# Patient Record
Sex: FEMALE | Race: WHITE | NOT HISPANIC OR LATINO | Employment: OTHER | URBAN - METROPOLITAN AREA
[De-identification: names, ages, dates, MRNs, and addresses within clinical notes are randomized per-mention and may not be internally consistent; named-entity substitution may affect disease eponyms.]

---

## 2023-11-10 PROBLEM — M54.10 RADICULOPATHY OF LEG: Status: ACTIVE | Noted: 2023-11-10

## 2023-11-13 ENCOUNTER — LAB (OUTPATIENT)
Dept: LAB | Facility: LAB | Age: 68
End: 2023-11-13
Payer: MEDICARE

## 2023-11-13 ENCOUNTER — PRE-ADMISSION TESTING (OUTPATIENT)
Dept: PREADMISSION TESTING | Facility: HOSPITAL | Age: 68
End: 2023-11-13
Payer: MEDICARE

## 2023-11-13 VITALS
TEMPERATURE: 98.4 F | SYSTOLIC BLOOD PRESSURE: 140 MMHG | BODY MASS INDEX: 25.59 KG/M2 | HEIGHT: 63 IN | WEIGHT: 144.4 LBS | DIASTOLIC BLOOD PRESSURE: 78 MMHG | RESPIRATION RATE: 12 BRPM | HEART RATE: 85 BPM | OXYGEN SATURATION: 96 %

## 2023-11-13 DIAGNOSIS — M16.12 OSTEOARTHRITIS OF LEFT HIP, UNSPECIFIED OSTEOARTHRITIS TYPE: ICD-10-CM

## 2023-11-13 DIAGNOSIS — Z01.818 PREOP EXAMINATION: ICD-10-CM

## 2023-11-13 DIAGNOSIS — M16.12 OSTEOARTHRITIS OF LEFT HIP, UNSPECIFIED OSTEOARTHRITIS TYPE: Primary | ICD-10-CM

## 2023-11-13 LAB
ANION GAP SERPL CALC-SCNC: 12 MMOL/L (ref 10–20)
BUN SERPL-MCNC: 15 MG/DL (ref 6–23)
CALCIUM SERPL-MCNC: 10.1 MG/DL (ref 8.6–10.3)
CHLORIDE SERPL-SCNC: 101 MMOL/L (ref 98–107)
CO2 SERPL-SCNC: 29 MMOL/L (ref 21–32)
CREAT SERPL-MCNC: 0.69 MG/DL (ref 0.5–1.05)
ERYTHROCYTE [DISTWIDTH] IN BLOOD BY AUTOMATED COUNT: 13.2 % (ref 11.5–14.5)
GFR SERPL CREATININE-BSD FRML MDRD: >90 ML/MIN/1.73M*2
GLUCOSE SERPL-MCNC: 83 MG/DL (ref 74–99)
HCT VFR BLD AUTO: 42 % (ref 36–46)
HGB BLD-MCNC: 13.7 G/DL (ref 12–16)
MCH RBC QN AUTO: 31.1 PG (ref 26–34)
MCHC RBC AUTO-ENTMCNC: 32.6 G/DL (ref 32–36)
MCV RBC AUTO: 95 FL (ref 80–100)
NRBC BLD-RTO: 0 /100 WBCS (ref 0–0)
PLATELET # BLD AUTO: 275 X10*3/UL (ref 150–450)
POTASSIUM SERPL-SCNC: 4.4 MMOL/L (ref 3.5–5.3)
RBC # BLD AUTO: 4.41 X10*6/UL (ref 4–5.2)
SODIUM SERPL-SCNC: 138 MMOL/L (ref 136–145)
WBC # BLD AUTO: 10.9 X10*3/UL (ref 4.4–11.3)

## 2023-11-13 PROCEDURE — 86901 BLOOD TYPING SEROLOGIC RH(D): CPT

## 2023-11-13 PROCEDURE — 85027 COMPLETE CBC AUTOMATED: CPT

## 2023-11-13 PROCEDURE — 99202 OFFICE O/P NEW SF 15 MIN: CPT

## 2023-11-13 PROCEDURE — 93010 ELECTROCARDIOGRAM REPORT: CPT | Performed by: INTERNAL MEDICINE

## 2023-11-13 PROCEDURE — 93005 ELECTROCARDIOGRAM TRACING: CPT

## 2023-11-13 PROCEDURE — 87081 CULTURE SCREEN ONLY: CPT | Mod: STJLAB | Performed by: ORTHOPAEDIC SURGERY

## 2023-11-13 PROCEDURE — 86850 RBC ANTIBODY SCREEN: CPT

## 2023-11-13 PROCEDURE — 86900 BLOOD TYPING SEROLOGIC ABO: CPT

## 2023-11-13 PROCEDURE — 80048 BASIC METABOLIC PNL TOTAL CA: CPT

## 2023-11-13 PROCEDURE — 36415 COLL VENOUS BLD VENIPUNCTURE: CPT

## 2023-11-13 RX ORDER — CHLORHEXIDINE GLUCONATE ORAL RINSE 1.2 MG/ML
SOLUTION DENTAL
Qty: 473 ML | Refills: 0 | Status: SHIPPED | OUTPATIENT
Start: 2023-11-27 | End: 2023-11-28 | Stop reason: HOSPADM

## 2023-11-13 RX ORDER — NAPROXEN 250 MG/1
500 TABLET ORAL DAILY PRN
COMMUNITY
End: 2023-11-28 | Stop reason: HOSPADM

## 2023-11-13 ASSESSMENT — DUKE ACTIVITY SCORE INDEX (DASI)
CAN YOU DO YARD WORK LIKE RAKING LEAVES, WEEDING OR PUSHING A MOWER: NO
CAN YOU RUN A SHORT DISTANCE: NO
CAN YOU DO LIGHT WORK AROUND THE HOUSE LIKE DUSTING OR WASHING DISHES: YES
CAN YOU PARTICIPATE IN STRENOUS SPORTS LIKE SWIMMING, SINGLES TENNIS, FOOTBALL, BASKETBALL, OR SKIING: NO
CAN YOU PARTICIPATE IN MODERATE RECREATIONAL ACTIVITIES LIKE GOLF, BOWLING, DANCING, DOUBLES TENNIS OR THROWING A BASEBALL OR FOOTBALL: NO
TOTAL_SCORE: 21.45
CAN YOU CLIMB A FLIGHT OF STAIRS OR WALK UP A HILL: YES
CAN YOU WALK INDOORS, SUCH AS AROUND YOUR HOUSE: YES
DASI METS SCORE: 5.4
CAN YOU HAVE SEXUAL RELATIONS: YES
CAN YOU TAKE CARE OF YOURSELF (EAT, DRESS, BATHE, OR USE TOILET): YES
CAN YOU WALK A BLOCK OR TWO ON LEVEL GROUND: NO
CAN YOU DO MODERATE WORK AROUND THE HOUSE LIKE VACUUMING, SWEEPING FLOORS OR CARRYING GROCERIES: YES
CAN YOU DO HEAVY WORK AROUND THE HOUSE LIKE SCRUBBING FLOORS OR LIFTING AND MOVING HEAVY FURNITURE: NO

## 2023-11-13 ASSESSMENT — ACTIVITIES OF DAILY LIVING (ADL): ADL_SCORE: 0

## 2023-11-13 ASSESSMENT — CHADS2 SCORE
HYPERTENSION: NO
PRIOR STROKE OR TIA OR THROMBOEMBOLISM: NO
CHADS2 SCORE: 0
AGE GREATER THAN OR EQUAL TO 75: NO
DIABETES: NO
CHF: NO

## 2023-11-13 ASSESSMENT — LIFESTYLE VARIABLES: SMOKING_STATUS: SMOKER

## 2023-11-13 ASSESSMENT — PAIN - FUNCTIONAL ASSESSMENT: PAIN_FUNCTIONAL_ASSESSMENT: 0-10

## 2023-11-13 NOTE — H&P (VIEW-ONLY)
"CPM/PAT Evaluation       Name: Rosaline Sanches (Rosaline Sanches)  /Age: 1955/68 y.o.     In-Person       Chief Complaint: Left hip pain    HPI  Pleasant 69 y/o female presents with left hip replacement. She has been having pain for over 4 years now. She had her right hip replaced roughly 4 years ago. Pain is \"unbearable\" and had significantly worsened over time. Pain occasionally radiates down her leg to her ankle. Endorses occasional numbness/tingling in the left foot.     Past Medical History:   Diagnosis Date    Arthritis        Past Surgical History:   Procedure Laterality Date    HIP ARTHROPLASTY      right       Patient  has no history on file for sexual activity.    Family History   Problem Relation Name Age of Onset    Heart disease Mother         No Known Allergies    Prior to Admission medications    Not on File        Constitutional: Negative for fever, chills, or sweats   ENMT: Negative for nasal discharge, congestion, ear pain, mouth pain, throat pain. Positive for reading glasses.    Respiratory: Negative for cough, wheezing, shortness of breath   Cardiac: Negative for chest pain, dyspnea on exertion, palpitations   Gastrointestinal: Negative for nausea, vomiting, diarrhea, constipation, abdominal pain  Genitourinary: Negative for dysuria, flank pain, frequency, hematuria   Musculoskeletal: Negative for decreased ROM, pain, swelling, weakness. Positive for left hip pain.     Neurological: Negative for dizziness, confusion, headache  Psychiatric: Negative for mood changes   Skin: Negative for itching, rash, ulcer    Hematologic/Lymph: Negative for bruising, easy bleeding  Allergic/Immunologic: Negative itching, sneezing, swelling    Physical Exam  Vitals reviewed.   Constitutional:       Appearance: Normal appearance.   HENT:      Head: Normocephalic.      Mouth/Throat:      Mouth: Mucous membranes are moist.      Pharynx: Oropharynx is clear.   Eyes:      Pupils: Pupils are equal, round, and " reactive to light.   Cardiovascular:      Rate and Rhythm: Normal rate and regular rhythm.      Heart sounds: Normal heart sounds.   Pulmonary:      Effort: Pulmonary effort is normal.      Breath sounds: Normal breath sounds.   Abdominal:      General: Bowel sounds are normal.      Palpations: Abdomen is soft.   Musculoskeletal:         General: Normal range of motion.      Cervical back: Normal range of motion.   Skin:     General: Skin is warm and dry.   Neurological:      General: No focal deficit present.      Mental Status: She is alert and oriented to person, place, and time.   Psychiatric:         Mood and Affect: Mood normal.         Behavior: Behavior normal.        PAT AIRWAY:   Airway:     Mallampati::  II    Neck ROM::  Full  normal        Visit Vitals  /78   Pulse 85   Temp 36.9 °C (98.4 °F) (Temporal)   Resp 12       DASI Risk Score      Flowsheet Row Most Recent Value   DASI SCORE 21.45   METS Score (Will be calculated only when all the questions are answered) 5.4          Caprini DVT Assessment      Flowsheet Row Most Recent Value   DVT Score 12   Current Status Major surgery planned, lasting 2-3 hours, Elective major lower extremity arthroplasty   History Prior major surgery   Age 60-75 years   BMI 30 or less          Modified Frailty Index      Flowsheet Row Most Recent Value   Modified Frailty Index Calculator 0          CHADS2 Stroke Risk  Current as of 2 minutes ago        N/A 3 - 100%: High Risk   2 - 3%: Medium Risk   0 - 2%: Low Risk     Last Change: N/A          This score determines the patient's risk of having a stroke if the patient has atrial fibrillation.        This score is not applicable to this patient. Components are not calculated.          Revised Cardiac Risk Index    No data to display       Apfel Simplified Score    No data to display       Risk Analysis Index Results This Encounter         11/13/2023  1307             MNEDOZA Cancer History: Patient does not indicate  history of cancer    Total Risk Analysis Index Score Without Cancer: 20    Total Risk Analysis Index Score: 20          Stop Bang Score      Flowsheet Row Most Recent Value   Do you snore loudly? 0   Do you often feel tired or fatigued after your sleep? 1   Has anyone ever observed you stop breathing in your sleep? 0   Do you have or are you being treated for high blood pressure? 0   Recent BMI (Calculated) 25.6   Is BMI greater than 35 kg/m2? 0=No   Age older than 50 years old? 1=Yes   Is your neck circumference greater than 17 inches (Male) or 16 inches (Female)? 0   Gender - Male 0=No   STOP-BANG Total Score 2            Assessment and Plan:     OR with Dr. Tatum on 11/27  Labs ordered per anesthesia guidelines.   EKG obtained and enclosed. NSR.     Patient has no prior anesthesia complications. Has tolerated anesthesia well in the past.   See risk scores as previously documented.

## 2023-11-13 NOTE — PREPROCEDURE INSTRUCTIONS
Medication List            Accurate as of November 13, 2023  1:26 PM. Always use your most recent med list.                MAGNESIUM GLYCINATE ORAL  Medication Adjustments for Surgery: Stop 7 days before surgery     multivitamin-Ca-iron-minerals 27-0.4 mg tablet  Commonly known as: Tab-A-Louie Womens  Medication Adjustments for Surgery: Stop 7 days before surgery     naproxen 250 mg tablet  Commonly known as: Naprosyn  Medication Adjustments for Surgery: Stop 7 days before surgery                          PRE-OPERATIVE INSTRUCTIONS    You will receive notification one business day prior to your surgery to confirm your arrival time and additional information. It is important that you answer your phone and/or check your messages during this time.    Please enter the building through the Outpatient entrance. Take the elevator off the lobby to the 2nd floor and check in at the Outpatient Surgery desk    INSTRUCTIONS:  Talk to your surgeon for instructions if you should stop your aspirin, blood thinner, or diabetes medicines.  DO NOT take any multivitamins or over the counter supplements for 7-10 days before surgery.  If not being admitted, you must have an adult immediately available to drive you home after surgery. We also highly recommend you have someone stay with you for the entire day and night of your surgery.  For children having surgery, a parent or legal guardian must accompany t hem to the surgery center. If this is not possible, please call [phone] to make additional arrangements.  For adults who are unable to consent or make medical decisions for themselves, a legal guardian or Power of  must accompany them to the surgery center. If this is not possible, please call [phone] to make additional arrangements.  Wear comfortable, loose fitting clothing.  All jewelry and piercings must be removed. If you are unable to remove an item or have a dermal piercing, please be sure to tell the nurse  when you arrive for surgery.  Nail polish and make-up must be removed.  Avoid smoking or consuming alcohol for 24 hours before surgery.  To help prevent infection, please take a shower/bath and wash your hair the night before and/or morning of surgery.    Additional instructions about eating and drinking before surgery:  Do not eat any solid foods or drink anything but clear liquids within 6 hours of your arrival time for surgery. Milk, nutritional drinks/supplements, and infant formula are considered solid foods.  You may drink up to 12 oz. of clear liquids up to 2 hours before your arrival time for surgery, unless directed otherwise by your surgeon. Clear liquids include water, non-carbonated sports drinks (Gatorade), black tea or coffee (no creamers) and breast milk.    If you received a blue folder, please review additional information provided inside the folder regarding additional preparation.     If you have any questions or concerns, please call Pre-Admission Testing at [phone].

## 2023-11-13 NOTE — CPM/PAT H&P
"CPM/PAT Evaluation       Name: Rosaline Sanches (Rosaline Sanches)  /Age: 1955/68 y.o.     In-Person       Chief Complaint: Left hip pain    HPI  Pleasant 69 y/o female presents with left hip replacement. She has been having pain for over 4 years now. She had her right hip replaced roughly 4 years ago. Pain is \"unbearable\" and had significantly worsened over time. Pain occasionally radiates down her leg to her ankle. Endorses occasional numbness/tingling in the left foot.     Past Medical History:   Diagnosis Date    Arthritis        Past Surgical History:   Procedure Laterality Date    HIP ARTHROPLASTY      right       Patient  has no history on file for sexual activity.    Family History   Problem Relation Name Age of Onset    Heart disease Mother         No Known Allergies    Prior to Admission medications    Not on File        Constitutional: Negative for fever, chills, or sweats   ENMT: Negative for nasal discharge, congestion, ear pain, mouth pain, throat pain. Positive for reading glasses.    Respiratory: Negative for cough, wheezing, shortness of breath   Cardiac: Negative for chest pain, dyspnea on exertion, palpitations   Gastrointestinal: Negative for nausea, vomiting, diarrhea, constipation, abdominal pain  Genitourinary: Negative for dysuria, flank pain, frequency, hematuria   Musculoskeletal: Negative for decreased ROM, pain, swelling, weakness. Positive for left hip pain.     Neurological: Negative for dizziness, confusion, headache  Psychiatric: Negative for mood changes   Skin: Negative for itching, rash, ulcer    Hematologic/Lymph: Negative for bruising, easy bleeding  Allergic/Immunologic: Negative itching, sneezing, swelling    Physical Exam  Vitals reviewed.   Constitutional:       Appearance: Normal appearance.   HENT:      Head: Normocephalic.      Mouth/Throat:      Mouth: Mucous membranes are moist.      Pharynx: Oropharynx is clear.   Eyes:      Pupils: Pupils are equal, round, and " reactive to light.   Cardiovascular:      Rate and Rhythm: Normal rate and regular rhythm.      Heart sounds: Normal heart sounds.   Pulmonary:      Effort: Pulmonary effort is normal.      Breath sounds: Normal breath sounds.   Abdominal:      General: Bowel sounds are normal.      Palpations: Abdomen is soft.   Musculoskeletal:         General: Normal range of motion.      Cervical back: Normal range of motion.   Skin:     General: Skin is warm and dry.   Neurological:      General: No focal deficit present.      Mental Status: She is alert and oriented to person, place, and time.   Psychiatric:         Mood and Affect: Mood normal.         Behavior: Behavior normal.        PAT AIRWAY:   Airway:     Mallampati::  II    Neck ROM::  Full  normal        Visit Vitals  /78   Pulse 85   Temp 36.9 °C (98.4 °F) (Temporal)   Resp 12       DASI Risk Score      Flowsheet Row Most Recent Value   DASI SCORE 21.45   METS Score (Will be calculated only when all the questions are answered) 5.4          Caprini DVT Assessment      Flowsheet Row Most Recent Value   DVT Score 12   Current Status Major surgery planned, lasting 2-3 hours, Elective major lower extremity arthroplasty   History Prior major surgery   Age 60-75 years   BMI 30 or less          Modified Frailty Index      Flowsheet Row Most Recent Value   Modified Frailty Index Calculator 0          CHADS2 Stroke Risk  Current as of 2 minutes ago        N/A 3 - 100%: High Risk   2 - 3%: Medium Risk   0 - 2%: Low Risk     Last Change: N/A          This score determines the patient's risk of having a stroke if the patient has atrial fibrillation.        This score is not applicable to this patient. Components are not calculated.          Revised Cardiac Risk Index    No data to display       Apfel Simplified Score    No data to display       Risk Analysis Index Results This Encounter         11/13/2023  1307             MENDOZA Cancer History: Patient does not indicate  history of cancer    Total Risk Analysis Index Score Without Cancer: 20    Total Risk Analysis Index Score: 20          Stop Bang Score      Flowsheet Row Most Recent Value   Do you snore loudly? 0   Do you often feel tired or fatigued after your sleep? 1   Has anyone ever observed you stop breathing in your sleep? 0   Do you have or are you being treated for high blood pressure? 0   Recent BMI (Calculated) 25.6   Is BMI greater than 35 kg/m2? 0=No   Age older than 50 years old? 1=Yes   Is your neck circumference greater than 17 inches (Male) or 16 inches (Female)? 0   Gender - Male 0=No   STOP-BANG Total Score 2            Assessment and Plan:     OR with Dr. Tatum on 11/27  Labs ordered per anesthesia guidelines.   EKG obtained and enclosed. NSR.     Patient has no prior anesthesia complications. Has tolerated anesthesia well in the past.   See risk scores as previously documented.

## 2023-11-14 LAB
ABO GROUP (TYPE) IN BLOOD: NORMAL
ANTIBODY SCREEN: NORMAL
RH FACTOR (ANTIGEN D): NORMAL

## 2023-11-15 LAB — STAPHYLOCOCCUS SPEC CULT: NORMAL

## 2023-11-27 ENCOUNTER — ANESTHESIA (OUTPATIENT)
Dept: OPERATING ROOM | Facility: HOSPITAL | Age: 68
End: 2023-11-27
Payer: MEDICARE

## 2023-11-27 ENCOUNTER — APPOINTMENT (OUTPATIENT)
Dept: RADIOLOGY | Facility: HOSPITAL | Age: 68
End: 2023-11-27
Payer: MEDICARE

## 2023-11-27 ENCOUNTER — HOSPITAL ENCOUNTER (OUTPATIENT)
Facility: HOSPITAL | Age: 68
Discharge: HOME | End: 2023-11-28
Attending: ORTHOPAEDIC SURGERY | Admitting: ORTHOPAEDIC SURGERY
Payer: MEDICARE

## 2023-11-27 ENCOUNTER — ANESTHESIA EVENT (OUTPATIENT)
Dept: OPERATING ROOM | Facility: HOSPITAL | Age: 68
End: 2023-11-27
Payer: MEDICARE

## 2023-11-27 DIAGNOSIS — M16.12 OSTEOARTHRITIS OF LEFT HIP, UNSPECIFIED OSTEOARTHRITIS TYPE: Primary | ICD-10-CM

## 2023-11-27 PROCEDURE — 2780000003 HC OR 278 NO HCPCS: Performed by: ORTHOPAEDIC SURGERY

## 2023-11-27 PROCEDURE — 2500000005 HC RX 250 GENERAL PHARMACY W/O HCPCS: Performed by: ORTHOPAEDIC SURGERY

## 2023-11-27 PROCEDURE — 2500000004 HC RX 250 GENERAL PHARMACY W/ HCPCS (ALT 636 FOR OP/ED): Performed by: ORTHOPAEDIC SURGERY

## 2023-11-27 PROCEDURE — A27130 PR TOTAL HIP ARTHROPLASTY: Performed by: ANESTHESIOLOGY

## 2023-11-27 PROCEDURE — 72170 X-RAY EXAM OF PELVIS: CPT | Performed by: RADIOLOGY

## 2023-11-27 PROCEDURE — 3700000002 HC GENERAL ANESTHESIA TIME - EACH INCREMENTAL 1 MINUTE: Performed by: ORTHOPAEDIC SURGERY

## 2023-11-27 PROCEDURE — 2500000001 HC RX 250 WO HCPCS SELF ADMINISTERED DRUGS (ALT 637 FOR MEDICARE OP): Performed by: ORTHOPAEDIC SURGERY

## 2023-11-27 PROCEDURE — 7100000002 HC RECOVERY ROOM TIME - EACH INCREMENTAL 1 MINUTE: Performed by: ORTHOPAEDIC SURGERY

## 2023-11-27 PROCEDURE — 3600000018 HC OR TIME - INITIAL BASE CHARGE - PROCEDURE LEVEL SIX: Performed by: ORTHOPAEDIC SURGERY

## 2023-11-27 PROCEDURE — 7100000001 HC RECOVERY ROOM TIME - INITIAL BASE CHARGE: Performed by: ORTHOPAEDIC SURGERY

## 2023-11-27 PROCEDURE — 7100000011 HC EXTENDED STAY RECOVERY HOURLY - NURSING UNIT

## 2023-11-27 PROCEDURE — 2720000007 HC OR 272 NO HCPCS: Performed by: ORTHOPAEDIC SURGERY

## 2023-11-27 PROCEDURE — 2500000005 HC RX 250 GENERAL PHARMACY W/O HCPCS: Performed by: NURSE ANESTHETIST, CERTIFIED REGISTERED

## 2023-11-27 PROCEDURE — 3600000017 HC OR TIME - EACH INCREMENTAL 1 MINUTE - PROCEDURE LEVEL SIX: Performed by: ORTHOPAEDIC SURGERY

## 2023-11-27 PROCEDURE — 97161 PT EVAL LOW COMPLEX 20 MIN: CPT | Mod: GP

## 2023-11-27 PROCEDURE — A27130 PR TOTAL HIP ARTHROPLASTY: Performed by: NURSE ANESTHETIST, CERTIFIED REGISTERED

## 2023-11-27 PROCEDURE — A6213 FOAM DRG >16<=48 SQ IN W/BDR: HCPCS | Performed by: ORTHOPAEDIC SURGERY

## 2023-11-27 PROCEDURE — 72170 X-RAY EXAM OF PELVIS: CPT

## 2023-11-27 PROCEDURE — C1776 JOINT DEVICE (IMPLANTABLE): HCPCS | Performed by: ORTHOPAEDIC SURGERY

## 2023-11-27 PROCEDURE — 3700000001 HC GENERAL ANESTHESIA TIME - INITIAL BASE CHARGE: Performed by: ORTHOPAEDIC SURGERY

## 2023-11-27 PROCEDURE — 2500000004 HC RX 250 GENERAL PHARMACY W/ HCPCS (ALT 636 FOR OP/ED): Performed by: NURSE ANESTHETIST, CERTIFIED REGISTERED

## 2023-11-27 PROCEDURE — 97116 GAIT TRAINING THERAPY: CPT | Mod: GP

## 2023-11-27 PROCEDURE — 2500000005 HC RX 250 GENERAL PHARMACY W/O HCPCS: Performed by: ANESTHESIOLOGY

## 2023-11-27 PROCEDURE — 2500000004 HC RX 250 GENERAL PHARMACY W/ HCPCS (ALT 636 FOR OP/ED): Performed by: ANESTHESIOLOGY

## 2023-11-27 DEVICE — CERAMIC V40 FEMORAL HEAD
Type: IMPLANTABLE DEVICE | Site: HIP | Status: FUNCTIONAL
Brand: BIOLOX

## 2023-11-27 DEVICE — TRIDENT X3 0 DEGREE POLYETHYLENE INSERT
Type: IMPLANTABLE DEVICE | Site: HIP | Status: FUNCTIONAL
Brand: TRIDENT X3 INSERT

## 2023-11-27 DEVICE — TRIDENT II CLUSTERHOLE HA ACETABULAR SHELL 52E
Type: IMPLANTABLE DEVICE | Site: HIP | Status: FUNCTIONAL
Brand: TRIDENT II

## 2023-11-27 DEVICE — 132 DEGREE NECK ANGLE HIP STEM
Type: IMPLANTABLE DEVICE | Site: HIP | Status: FUNCTIONAL
Brand: ACCOLADE

## 2023-11-27 RX ORDER — OXYCODONE HYDROCHLORIDE 5 MG/1
5 TABLET ORAL EVERY 6 HOURS PRN
Status: DISCONTINUED | OUTPATIENT
Start: 2023-11-27 | End: 2023-11-27

## 2023-11-27 RX ORDER — CELECOXIB 200 MG/1
200 CAPSULE ORAL ONCE
Status: COMPLETED | OUTPATIENT
Start: 2023-11-27 | End: 2023-11-27

## 2023-11-27 RX ORDER — OXYCODONE HYDROCHLORIDE 10 MG/1
10 TABLET ORAL EVERY 4 HOURS PRN
Status: DISCONTINUED | OUTPATIENT
Start: 2023-11-27 | End: 2023-11-28 | Stop reason: HOSPADM

## 2023-11-27 RX ORDER — ONDANSETRON HYDROCHLORIDE 2 MG/ML
4 INJECTION, SOLUTION INTRAVENOUS EVERY 8 HOURS PRN
Status: DISCONTINUED | OUTPATIENT
Start: 2023-11-27 | End: 2023-11-28 | Stop reason: HOSPADM

## 2023-11-27 RX ORDER — CEFAZOLIN 1 G/1
INJECTION, POWDER, FOR SOLUTION INTRAVENOUS AS NEEDED
Status: DISCONTINUED | OUTPATIENT
Start: 2023-11-27 | End: 2023-11-27

## 2023-11-27 RX ORDER — ALBUTEROL SULFATE 0.83 MG/ML
2.5 SOLUTION RESPIRATORY (INHALATION)
Status: DISCONTINUED | OUTPATIENT
Start: 2023-11-27 | End: 2023-11-27 | Stop reason: HOSPADM

## 2023-11-27 RX ORDER — LABETALOL HYDROCHLORIDE 5 MG/ML
5 INJECTION, SOLUTION INTRAVENOUS
Status: DISCONTINUED | OUTPATIENT
Start: 2023-11-27 | End: 2023-11-27 | Stop reason: HOSPADM

## 2023-11-27 RX ORDER — HYDROMORPHONE HYDROCHLORIDE 1 MG/ML
1 INJECTION, SOLUTION INTRAMUSCULAR; INTRAVENOUS; SUBCUTANEOUS EVERY 5 MIN PRN
Status: DISCONTINUED | OUTPATIENT
Start: 2023-11-27 | End: 2023-11-27 | Stop reason: HOSPADM

## 2023-11-27 RX ORDER — ACETAMINOPHEN 325 MG/1
650 TABLET ORAL EVERY 6 HOURS SCHEDULED
Status: DISCONTINUED | OUTPATIENT
Start: 2023-11-27 | End: 2023-11-28 | Stop reason: HOSPADM

## 2023-11-27 RX ORDER — HYDRALAZINE HYDROCHLORIDE 20 MG/ML
INJECTION INTRAMUSCULAR; INTRAVENOUS AS NEEDED
Status: DISCONTINUED | OUTPATIENT
Start: 2023-11-27 | End: 2023-11-27

## 2023-11-27 RX ORDER — SODIUM CHLORIDE 9 MG/ML
100 INJECTION, SOLUTION INTRAVENOUS CONTINUOUS
Status: DISCONTINUED | OUTPATIENT
Start: 2023-11-27 | End: 2023-11-28 | Stop reason: HOSPADM

## 2023-11-27 RX ORDER — ROCURONIUM BROMIDE 50 MG/5 ML
SYRINGE (ML) INTRAVENOUS AS NEEDED
Status: DISCONTINUED | OUTPATIENT
Start: 2023-11-27 | End: 2023-11-27

## 2023-11-27 RX ORDER — MIDAZOLAM HYDROCHLORIDE 1 MG/ML
INJECTION, SOLUTION INTRAMUSCULAR; INTRAVENOUS AS NEEDED
Status: DISCONTINUED | OUTPATIENT
Start: 2023-11-27 | End: 2023-11-27

## 2023-11-27 RX ORDER — METOCLOPRAMIDE HYDROCHLORIDE 5 MG/ML
10 INJECTION INTRAMUSCULAR; INTRAVENOUS ONCE AS NEEDED
Status: DISCONTINUED | OUTPATIENT
Start: 2023-11-27 | End: 2023-11-27 | Stop reason: HOSPADM

## 2023-11-27 RX ORDER — GLYCOPYRROLATE 0.2 MG/ML
INJECTION INTRAMUSCULAR; INTRAVENOUS AS NEEDED
Status: DISCONTINUED | OUTPATIENT
Start: 2023-11-27 | End: 2023-11-27

## 2023-11-27 RX ORDER — CEFAZOLIN SODIUM 2 G/100ML
2 INJECTION, SOLUTION INTRAVENOUS ONCE
Status: DISCONTINUED | OUTPATIENT
Start: 2023-11-27 | End: 2023-11-27 | Stop reason: HOSPADM

## 2023-11-27 RX ORDER — PROPOFOL 10 MG/ML
INJECTION, EMULSION INTRAVENOUS AS NEEDED
Status: DISCONTINUED | OUTPATIENT
Start: 2023-11-27 | End: 2023-11-27

## 2023-11-27 RX ORDER — GABAPENTIN 300 MG/1
600 CAPSULE ORAL ONCE
Status: COMPLETED | OUTPATIENT
Start: 2023-11-27 | End: 2023-11-27

## 2023-11-27 RX ORDER — OXYCODONE HYDROCHLORIDE 5 MG/1
5 TABLET ORAL EVERY 4 HOURS PRN
Status: DISCONTINUED | OUTPATIENT
Start: 2023-11-27 | End: 2023-11-28 | Stop reason: HOSPADM

## 2023-11-27 RX ORDER — SODIUM CHLORIDE, SODIUM LACTATE, POTASSIUM CHLORIDE, CALCIUM CHLORIDE 600; 310; 30; 20 MG/100ML; MG/100ML; MG/100ML; MG/100ML
100 INJECTION, SOLUTION INTRAVENOUS CONTINUOUS
Status: DISCONTINUED | OUTPATIENT
Start: 2023-11-27 | End: 2023-11-27 | Stop reason: HOSPADM

## 2023-11-27 RX ORDER — DIPHENHYDRAMINE HYDROCHLORIDE 50 MG/ML
12.5 INJECTION INTRAMUSCULAR; INTRAVENOUS EVERY 6 HOURS PRN
Status: DISCONTINUED | OUTPATIENT
Start: 2023-11-27 | End: 2023-11-28 | Stop reason: HOSPADM

## 2023-11-27 RX ORDER — ONDANSETRON 4 MG/1
4 TABLET, ORALLY DISINTEGRATING ORAL EVERY 8 HOURS PRN
Status: DISCONTINUED | OUTPATIENT
Start: 2023-11-27 | End: 2023-11-28 | Stop reason: HOSPADM

## 2023-11-27 RX ORDER — TRANEXAMIC ACID 650 MG/1
1300 TABLET ORAL ONCE
Status: COMPLETED | OUTPATIENT
Start: 2023-11-27 | End: 2023-11-27

## 2023-11-27 RX ORDER — ASPIRIN 81 MG/1
81 TABLET ORAL 2 TIMES DAILY
Status: DISCONTINUED | OUTPATIENT
Start: 2023-11-28 | End: 2023-11-28 | Stop reason: HOSPADM

## 2023-11-27 RX ORDER — LIDOCAINE HYDROCHLORIDE 20 MG/ML
INJECTION, SOLUTION EPIDURAL; INFILTRATION; INTRACAUDAL; PERINEURAL AS NEEDED
Status: DISCONTINUED | OUTPATIENT
Start: 2023-11-27 | End: 2023-11-27

## 2023-11-27 RX ORDER — ONDANSETRON HYDROCHLORIDE 2 MG/ML
INJECTION, SOLUTION INTRAVENOUS AS NEEDED
Status: DISCONTINUED | OUTPATIENT
Start: 2023-11-27 | End: 2023-11-27

## 2023-11-27 RX ORDER — NALOXONE HYDROCHLORIDE 0.4 MG/ML
0.2 INJECTION, SOLUTION INTRAMUSCULAR; INTRAVENOUS; SUBCUTANEOUS EVERY 5 MIN PRN
Status: DISCONTINUED | OUTPATIENT
Start: 2023-11-27 | End: 2023-11-28 | Stop reason: HOSPADM

## 2023-11-27 RX ORDER — HYDROMORPHONE HYDROCHLORIDE 1 MG/ML
0.5 INJECTION, SOLUTION INTRAMUSCULAR; INTRAVENOUS; SUBCUTANEOUS EVERY 5 MIN PRN
Status: DISCONTINUED | OUTPATIENT
Start: 2023-11-27 | End: 2023-11-27 | Stop reason: HOSPADM

## 2023-11-27 RX ORDER — HYDRALAZINE HYDROCHLORIDE 20 MG/ML
5 INJECTION INTRAMUSCULAR; INTRAVENOUS EVERY 30 MIN PRN
Status: DISCONTINUED | OUTPATIENT
Start: 2023-11-27 | End: 2023-11-27 | Stop reason: HOSPADM

## 2023-11-27 RX ORDER — ACETAMINOPHEN 325 MG/1
975 TABLET ORAL ONCE
Status: COMPLETED | OUTPATIENT
Start: 2023-11-27 | End: 2023-11-27

## 2023-11-27 RX ORDER — HYDROMORPHONE HYDROCHLORIDE 1 MG/ML
INJECTION, SOLUTION INTRAMUSCULAR; INTRAVENOUS; SUBCUTANEOUS AS NEEDED
Status: DISCONTINUED | OUTPATIENT
Start: 2023-11-27 | End: 2023-11-27

## 2023-11-27 RX ORDER — PROPOFOL 10 MG/ML
INJECTION, EMULSION INTRAVENOUS CONTINUOUS PRN
Status: DISCONTINUED | OUTPATIENT
Start: 2023-11-27 | End: 2023-11-27

## 2023-11-27 RX ORDER — DEXAMETHASONE SODIUM PHOSPHATE 4 MG/ML
INJECTION, SOLUTION INTRA-ARTICULAR; INTRALESIONAL; INTRAMUSCULAR; INTRAVENOUS; SOFT TISSUE AS NEEDED
Status: DISCONTINUED | OUTPATIENT
Start: 2023-11-27 | End: 2023-11-27

## 2023-11-27 RX ORDER — CEFAZOLIN SODIUM 2 G/100ML
2 INJECTION, SOLUTION INTRAVENOUS EVERY 8 HOURS
Status: COMPLETED | OUTPATIENT
Start: 2023-11-27 | End: 2023-11-28

## 2023-11-27 RX ORDER — POLYETHYLENE GLYCOL 3350 17 G/17G
17 POWDER, FOR SOLUTION ORAL DAILY
Status: DISCONTINUED | OUTPATIENT
Start: 2023-11-27 | End: 2023-11-28 | Stop reason: HOSPADM

## 2023-11-27 RX ORDER — HYDROCODONE BITARTRATE AND ACETAMINOPHEN 5; 325 MG/1; MG/1
1 TABLET ORAL EVERY 4 HOURS PRN
Status: DISCONTINUED | OUTPATIENT
Start: 2023-11-27 | End: 2023-11-27 | Stop reason: HOSPADM

## 2023-11-27 RX ORDER — DIPHENHYDRAMINE HYDROCHLORIDE 50 MG/ML
12.5 INJECTION INTRAMUSCULAR; INTRAVENOUS ONCE AS NEEDED
Status: DISCONTINUED | OUTPATIENT
Start: 2023-11-27 | End: 2023-11-27 | Stop reason: HOSPADM

## 2023-11-27 RX ORDER — FENTANYL CITRATE 50 UG/ML
INJECTION, SOLUTION INTRAMUSCULAR; INTRAVENOUS AS NEEDED
Status: DISCONTINUED | OUTPATIENT
Start: 2023-11-27 | End: 2023-11-27

## 2023-11-27 RX ADMIN — ACETAMINOPHEN 975 MG: 325 TABLET ORAL at 10:42

## 2023-11-27 RX ADMIN — FENTANYL CITRATE 50 MCG: 50 INJECTION, SOLUTION INTRAMUSCULAR; INTRAVENOUS at 11:42

## 2023-11-27 RX ADMIN — HYDROMORPHONE HYDROCHLORIDE 0.5 MG: 1 INJECTION, SOLUTION INTRAMUSCULAR; INTRAVENOUS; SUBCUTANEOUS at 13:21

## 2023-11-27 RX ADMIN — FENTANYL CITRATE 50 MCG: 50 INJECTION, SOLUTION INTRAMUSCULAR; INTRAVENOUS at 11:09

## 2023-11-27 RX ADMIN — Medication 50 MG: at 11:09

## 2023-11-27 RX ADMIN — OXYCODONE HYDROCHLORIDE 10 MG: 10 TABLET ORAL at 16:17

## 2023-11-27 RX ADMIN — PROPOFOL 50 MG: 10 INJECTION, EMULSION INTRAVENOUS at 12:28

## 2023-11-27 RX ADMIN — POLYETHYLENE GLYCOL 3350 17 G: 17 POWDER, FOR SOLUTION ORAL at 16:23

## 2023-11-27 RX ADMIN — HYDROMORPHONE HYDROCHLORIDE 0.5 MG: 1 INJECTION, SOLUTION INTRAMUSCULAR; INTRAVENOUS; SUBCUTANEOUS at 13:10

## 2023-11-27 RX ADMIN — ACETAMINOPHEN 650 MG: 325 TABLET ORAL at 18:08

## 2023-11-27 RX ADMIN — HYDROMORPHONE HYDROCHLORIDE 1 MG: 1 INJECTION, SOLUTION INTRAMUSCULAR; INTRAVENOUS; SUBCUTANEOUS at 12:01

## 2023-11-27 RX ADMIN — Medication 2 L/MIN: at 16:15

## 2023-11-27 RX ADMIN — HYDROMORPHONE HYDROCHLORIDE 1 MG: 1 INJECTION, SOLUTION INTRAMUSCULAR; INTRAVENOUS; SUBCUTANEOUS at 11:30

## 2023-11-27 RX ADMIN — Medication 6 L/MIN: at 14:00

## 2023-11-27 RX ADMIN — PROPOFOL 50 MG: 10 INJECTION, EMULSION INTRAVENOUS at 12:20

## 2023-11-27 RX ADMIN — CEFAZOLIN SODIUM 2 G: 1 POWDER, FOR SOLUTION INTRAMUSCULAR; INTRAVENOUS at 11:22

## 2023-11-27 RX ADMIN — HYDROMORPHONE HYDROCHLORIDE 0.5 MG: 1 INJECTION, SOLUTION INTRAMUSCULAR; INTRAVENOUS; SUBCUTANEOUS at 13:30

## 2023-11-27 RX ADMIN — SODIUM CHLORIDE 100 ML/HR: 9 INJECTION, SOLUTION INTRAVENOUS at 16:17

## 2023-11-27 RX ADMIN — CEFAZOLIN SODIUM 2 G: 2 INJECTION, SOLUTION INTRAVENOUS at 20:38

## 2023-11-27 RX ADMIN — PROPOFOL 50 MCG/KG/MIN: 10 INJECTION, EMULSION INTRAVENOUS at 11:15

## 2023-11-27 RX ADMIN — SUGAMMADEX 200 MG: 100 INJECTION, SOLUTION INTRAVENOUS at 12:25

## 2023-11-27 RX ADMIN — CELECOXIB 200 MG: 200 CAPSULE ORAL at 10:42

## 2023-11-27 RX ADMIN — Medication 15 MG: at 12:04

## 2023-11-27 RX ADMIN — HYDROMORPHONE HYDROCHLORIDE 0.5 MG: 1 INJECTION, SOLUTION INTRAMUSCULAR; INTRAVENOUS; SUBCUTANEOUS at 13:46

## 2023-11-27 RX ADMIN — MIDAZOLAM 1 MG: 1 INJECTION INTRAMUSCULAR; INTRAVENOUS at 11:03

## 2023-11-27 RX ADMIN — Medication 4 L/MIN: at 12:40

## 2023-11-27 RX ADMIN — SUGAMMADEX 100 MG: 100 INJECTION, SOLUTION INTRAVENOUS at 12:33

## 2023-11-27 RX ADMIN — Medication 10 MG: at 11:45

## 2023-11-27 RX ADMIN — PROPOFOL 140 MG: 10 INJECTION, EMULSION INTRAVENOUS at 11:09

## 2023-11-27 RX ADMIN — GLYCOPYRROLATE 0.2 MG: 0.2 INJECTION, SOLUTION INTRAMUSCULAR; INTRAVENOUS at 12:10

## 2023-11-27 RX ADMIN — HYDROMORPHONE HYDROCHLORIDE 0.5 MG: 1 INJECTION, SOLUTION INTRAMUSCULAR; INTRAVENOUS; SUBCUTANEOUS at 13:00

## 2023-11-27 RX ADMIN — MIDAZOLAM 1 MG: 1 INJECTION INTRAMUSCULAR; INTRAVENOUS at 11:05

## 2023-11-27 RX ADMIN — HYDRALAZINE HYDROCHLORIDE 5 MG: 20 INJECTION INTRAMUSCULAR; INTRAVENOUS at 12:03

## 2023-11-27 RX ADMIN — TRANEXAMIC ACID 1300 MG: 650 TABLET ORAL at 10:42

## 2023-11-27 RX ADMIN — DEXAMETHASONE SODIUM PHOSPHATE 10 MG: 4 INJECTION, SOLUTION INTRAMUSCULAR; INTRAVENOUS at 11:12

## 2023-11-27 RX ADMIN — GABAPENTIN 600 MG: 300 CAPSULE ORAL at 10:15

## 2023-11-27 RX ADMIN — Medication: at 13:30

## 2023-11-27 RX ADMIN — SODIUM CHLORIDE, SODIUM LACTATE, POTASSIUM CHLORIDE, AND CALCIUM CHLORIDE: 600; 310; 30; 20 INJECTION, SOLUTION INTRAVENOUS at 11:03

## 2023-11-27 RX ADMIN — OXYCODONE HYDROCHLORIDE 10 MG: 10 TABLET ORAL at 20:38

## 2023-11-27 RX ADMIN — HYDROMORPHONE HYDROCHLORIDE 0.5 MG: 1 INJECTION, SOLUTION INTRAMUSCULAR; INTRAVENOUS; SUBCUTANEOUS at 18:16

## 2023-11-27 RX ADMIN — LIDOCAINE HYDROCHLORIDE 60 MG: 20 INJECTION, SOLUTION EPIDURAL; INFILTRATION; INTRACAUDAL; PERINEURAL at 11:09

## 2023-11-27 RX ADMIN — ONDANSETRON 4 MG: 2 INJECTION INTRAMUSCULAR; INTRAVENOUS at 11:12

## 2023-11-27 RX ADMIN — SODIUM CHLORIDE, SODIUM LACTATE, POTASSIUM CHLORIDE, AND CALCIUM CHLORIDE: 600; 310; 30; 20 INJECTION, SOLUTION INTRAVENOUS at 11:42

## 2023-11-27 SDOH — SOCIAL STABILITY: SOCIAL INSECURITY: DO YOU FEEL UNSAFE GOING BACK TO THE PLACE WHERE YOU ARE LIVING?: NO

## 2023-11-27 SDOH — HEALTH STABILITY: MENTAL HEALTH: CURRENT SMOKER: 0

## 2023-11-27 SDOH — SOCIAL STABILITY: SOCIAL INSECURITY: HAVE YOU HAD THOUGHTS OF HARMING ANYONE ELSE?: NO

## 2023-11-27 SDOH — SOCIAL STABILITY: SOCIAL INSECURITY: ARE THERE ANY APPARENT SIGNS OF INJURIES/BEHAVIORS THAT COULD BE RELATED TO ABUSE/NEGLECT?: NO

## 2023-11-27 SDOH — SOCIAL STABILITY: SOCIAL INSECURITY: ABUSE: ADULT

## 2023-11-27 SDOH — SOCIAL STABILITY: SOCIAL INSECURITY: DO YOU FEEL ANYONE HAS EXPLOITED OR TAKEN ADVANTAGE OF YOU FINANCIALLY OR OF YOUR PERSONAL PROPERTY?: NO

## 2023-11-27 SDOH — SOCIAL STABILITY: SOCIAL INSECURITY: HAS ANYONE EVER THREATENED TO HURT YOUR FAMILY OR YOUR PETS?: NO

## 2023-11-27 SDOH — SOCIAL STABILITY: SOCIAL INSECURITY: DOES ANYONE TRY TO KEEP YOU FROM HAVING/CONTACTING OTHER FRIENDS OR DOING THINGS OUTSIDE YOUR HOME?: NO

## 2023-11-27 SDOH — SOCIAL STABILITY: SOCIAL INSECURITY: ARE YOU OR HAVE YOU BEEN THREATENED OR ABUSED PHYSICALLY, EMOTIONALLY, OR SEXUALLY BY ANYONE?: NO

## 2023-11-27 ASSESSMENT — LIFESTYLE VARIABLES
HOW OFTEN DO YOU HAVE 6 OR MORE DRINKS ON ONE OCCASION: NEVER
HOW OFTEN DO YOU HAVE A DRINK CONTAINING ALCOHOL: 2-4 TIMES A MONTH
SKIP TO QUESTIONS 9-10: 1
AUDIT-C TOTAL SCORE: 2
AUDIT-C TOTAL SCORE: 2
HOW MANY STANDARD DRINKS CONTAINING ALCOHOL DO YOU HAVE ON A TYPICAL DAY: 1 OR 2

## 2023-11-27 ASSESSMENT — ACTIVITIES OF DAILY LIVING (ADL)
GROOMING: INDEPENDENT
TOILETING: INDEPENDENT
HEARING - RIGHT EAR: FUNCTIONAL
BATHING: INDEPENDENT
JUDGMENT_ADEQUATE_SAFELY_COMPLETE_DAILY_ACTIVITIES: YES
WALKS IN HOME: INDEPENDENT
ADEQUATE_TO_COMPLETE_ADL: YES
DRESSING YOURSELF: INDEPENDENT
LACK_OF_TRANSPORTATION: NO
HEARING - LEFT EAR: FUNCTIONAL
FEEDING YOURSELF: INDEPENDENT
PATIENT'S MEMORY ADEQUATE TO SAFELY COMPLETE DAILY ACTIVITIES?: YES

## 2023-11-27 ASSESSMENT — PAIN - FUNCTIONAL ASSESSMENT

## 2023-11-27 ASSESSMENT — PAIN DESCRIPTION - DESCRIPTORS
DESCRIPTORS: ACHING
DESCRIPTORS: ACHING

## 2023-11-27 ASSESSMENT — COGNITIVE AND FUNCTIONAL STATUS - GENERAL
PATIENT BASELINE BEDBOUND: NO
TURNING FROM BACK TO SIDE WHILE IN FLAT BAD: A LITTLE
WALKING IN HOSPITAL ROOM: A LITTLE
MOBILITY SCORE: 18
MOVING FROM LYING ON BACK TO SITTING ON SIDE OF FLAT BED WITH BEDRAILS: A LITTLE
DAILY ACTIVITIY SCORE: 24
CLIMB 3 TO 5 STEPS WITH RAILING: A LITTLE
MOVING TO AND FROM BED TO CHAIR: A LITTLE
STANDING UP FROM CHAIR USING ARMS: A LITTLE
MOBILITY SCORE: 24

## 2023-11-27 ASSESSMENT — PAIN DESCRIPTION - ORIENTATION
ORIENTATION: LEFT

## 2023-11-27 ASSESSMENT — PAIN SCALES - GENERAL
PAIN_LEVEL: 0
PAINLEVEL_OUTOF10: 7
PAINLEVEL_OUTOF10: 8
PAINLEVEL_OUTOF10: 8
PAINLEVEL_OUTOF10: 3
PAINLEVEL_OUTOF10: 8
PAINLEVEL_OUTOF10: 4
PAINLEVEL_OUTOF10: 5 - MODERATE PAIN
PAINLEVEL_OUTOF10: 3
PAINLEVEL_OUTOF10: 8
PAINLEVEL_OUTOF10: 7
PAINLEVEL_OUTOF10: 8
PAINLEVEL_OUTOF10: 3
PAINLEVEL_OUTOF10: 9
PAINLEVEL_OUTOF10: 8
PAINLEVEL_OUTOF10: 9

## 2023-11-27 ASSESSMENT — PAIN DESCRIPTION - LOCATION
LOCATION: HIP
LOCATION: HIP
LOCATION: HAND
LOCATION: HIP

## 2023-11-27 ASSESSMENT — COLUMBIA-SUICIDE SEVERITY RATING SCALE - C-SSRS
1. IN THE PAST MONTH, HAVE YOU WISHED YOU WERE DEAD OR WISHED YOU COULD GO TO SLEEP AND NOT WAKE UP?: NO
2. HAVE YOU ACTUALLY HAD ANY THOUGHTS OF KILLING YOURSELF?: NO
6. HAVE YOU EVER DONE ANYTHING, STARTED TO DO ANYTHING, OR PREPARED TO DO ANYTHING TO END YOUR LIFE?: NO

## 2023-11-27 ASSESSMENT — PATIENT HEALTH QUESTIONNAIRE - PHQ9
2. FEELING DOWN, DEPRESSED OR HOPELESS: NOT AT ALL
SUM OF ALL RESPONSES TO PHQ9 QUESTIONS 1 & 2: 0
1. LITTLE INTEREST OR PLEASURE IN DOING THINGS: NOT AT ALL

## 2023-11-27 NOTE — ANESTHESIA PREPROCEDURE EVALUATION
Patient: Rosaline Sanches    Procedure Information       Anesthesia Start Date/Time: 11/27/23 1101    Procedure: LEFT TOTAL HIP REPLACEMENT (Left: Hip)    Location: [facility] / [facility]    Surgeons: Brendan Tatum MD            Relevant Problems   Neuro/Psych   (+) Radiculopathy of leg      Musculoskeletal   (+) Osteoarthritis of left hip, unspecified osteoarthritis type       Clinical information reviewed:   Tobacco  Allergies  Meds   Med Hx  Surg Hx  OB Status  Fam Hx  Soc   Hx        NPO Detail:  NPO/Void Status  Carbonhydrate Drink Given Prior to Surgery? : N  Date of Last Liquid: 11/27/23  Time of Last Liquid: 0700 (iced tea)  Date of Last Solid: 11/26/23  Time of Last Solid: 1830  Last Intake Type: Clear fluids  Time of Last Void: 0830         Physical Exam    Airway  Mallampati: III  TM distance: >3 FB  Neck ROM: full     Cardiovascular - normal exam     Dental - normal exam     Pulmonary - normal exam     Abdominal - normal exam           Vitals:    11/27/23 0953   BP: 125/70   Pulse: 74   Resp: 18   Temp: 36.4 °C (97.5 °F)   SpO2: 94%       Past Surgical History:   Procedure Laterality Date    BREAST SURGERY Bilateral     Breast augmentation    HIP ARTHROPLASTY      right     Past Medical History:   Diagnosis Date    Arthritis        Current Facility-Administered Medications:     ceFAZolin in dextrose (iso-os) (Ancef) IVPB 2 g, 2 g, intravenous, Once, Brendan Tatum MD    Facility-Administered Medications Ordered in Other Encounters:     ceFAZolin (Ancef) injection, , intravenous, PRN, Jaculine Nga, APRN-CRNA, 2 g at 11/27/23 1122    dexAMETHasone (Decadron) injection, , intravenous, PRN, Jaculine Nga, APRN-CRNA, 10 mg at 11/27/23 1112    fentaNYL PF (Sublimaze) injection, , intravenous, PRN, Jaculine Nga, APRN-CRNA, 50 mcg at 11/27/23 1142    HYDROmorphone (Dilaudid) injection, , intravenous, PRN, Jaculine Nga, APRN-CRNA, 1 mg at 11/27/23 1130    lactated Ringer's bolus, , intravenous,  Continuous PRN, Santoe Nga, APRN-CRNA, New Bag at 11/27/23 1142    lidocaine PF (Xylocaine) 20 mg/mL (2 %) injection, , intravenous, PRN, Jaculine Nga, APRN-CRNA, 60 mg at 11/27/23 1109    midazolam (Versed) injection, , intravenous, PRN, Jaculine Nga, APRN-CRNA, 1 mg at 11/27/23 1105    ondansetron (Zofran) injection, , intravenous, PRN, Jaculine Nga, APRN-CRNA, 4 mg at 11/27/23 1112    propofol (Diprivan) infusion, , intravenous, Continuous PRN, Jaculine Nga, APRN-CRNA, Last Rate: 19.05 mL/hr at 11/27/23 1115, 50 mcg/kg/min at 11/27/23 1115    propofol (Diprivan) injection, , intravenous, PRN, Jaculine Nga, APRN-CRNA, 140 mg at 11/27/23 1109    rocuronium (Zemuron) injection, , intravenous, PRN, Jaculine Nga, APRN-CRNA, 10 mg at 11/27/23 1145  Prior to Admission medications    Medication Sig Start Date End Date Taking? Authorizing Provider   chlorhexidine (Peridex) 0.12 % solution 15 milliliter(s) orally once a day for 2 doses 15 ml  the night before surgery and 15 ml morning of surgery - swish for 30 seconds -DO NOT SWALLOW, SPIT OUT Do not start before November 27, 2023. 11/27/23  Yes DEB Randall-GILES   MAGNESIUM GLYCINATE ORAL Take 1 tablet by mouth once daily.   Yes Historical Provider, MD   multivitamin-Ca-iron-minerals (Tab-A-Louie Womens) 27-0.4 mg tablet Take 2 tablets by mouth once daily.   Yes Historical Provider, MD   naproxen (Naprosyn) 250 mg tablet Take 2 tablets (500 mg) by mouth once daily as needed for mild pain (1 - 3).   Yes Historical Provider, MD     No Known Allergies  Social History     Tobacco Use    Smoking status: Every Day     Packs/day: .5     Types: Cigarettes    Smokeless tobacco: Never   Substance Use Topics    Alcohol use: Yes     Comment: 2 drinks per week         Chemistry    Lab Results   Component Value Date/Time     11/13/2023 1339    K 4.4 11/13/2023 1339     11/13/2023 1339    CO2 29 11/13/2023 1339    BUN 15 11/13/2023 1339    CREATININE 0.69  "11/13/2023 1339    Lab Results   Component Value Date/Time    CALCIUM 10.1 11/13/2023 1339          Lab Results   Component Value Date/Time    WBC 10.9 11/13/2023 1339    HGB 13.7 11/13/2023 1339    HCT 42.0 11/13/2023 1339     11/13/2023 1339     No results found for: \"PROTIME\", \"PTT\", \"INR\"  No results found for this or any previous visit (from the past 4464 hour(s)).     Anesthesia Plan    ASA 2     general     The patient is not a current smoker.    intravenous induction   Anesthetic plan and risks discussed with patient.    Plan discussed with CRNA.      "

## 2023-11-27 NOTE — PROGRESS NOTES
Physical Therapy    Physical Therapy Evaluation    Patient Name: Rosaline Sanches  MRN: [id number]  Today's Date: 11/27/2023   Time Calculation  Start Time: 1614  Stop Time: 1644  Time Calculation (min): 30 min    Assessment/Plan   PT Assessment  PT Assessment Results: Decreased strength, Decreased endurance, Decreased mobility, Pain  Rehab Prognosis: Good  Evaluation/Treatment Tolerance: Patient tolerated treatment well  Medical Staff Made Aware: Yes  End of Session Communication: Bedside nurse  Assessment Comment: expect good progress toward goals.  End of Session Patient Position: Bed, 3 rail up, Alarm on  IP OR SWING BED PT PLAN  Inpatient or Swing Bed: Inpatient  PT Plan  Treatment/Interventions: Bed mobility, Transfer training, Gait training, Stair training, Strengthening  PT Plan:  (expect home with HHC/PT.)  PT Frequency: BID  PT Discharge Recommendations: Low intensity level of continued care  Equipment Recommended upon Discharge: Wheeled walker, Straight cane  PT Recommended Transfer Status: Assist x1  PT - OK to Discharge: Yes    Subjective     Current Problem:  Patient Active Problem List   Diagnosis    Radiculopathy of leg    Osteoarthritis of left hip, unspecified osteoarthritis type       General Visit Information:  General  Reason for Referral: L THR  Referred By: Brendan Tatum MD  Past Medical History Relevant to Rehab: s/p R THR(2018), O-A, Radiculopathy.  Prior to Session Communication: Bedside nurse  Patient Position Received: Bed, 3 rail up, Alarm on  Preferred Learning Style: verbal, visual, written  General Comment: Has a abd. wedge, bed check, SCD's, and IV.    Home Living:  Home Living  Type of Home: House  Lives With: Adult children (Sister( a therapist) to stay with her.  does not want HHC/PT.)  Home Adaptive Equipment: Walker rolling or standard, Cane  Home Layout: One level (with basement.)  Home Access: Stairs to enter without rails  Entrance Stairs-Number of Steps: 1  Bathroom Shower/Tub:  "Tub/shower unit (with a GB.)  Home Living Comments: 12 down to basement with rail.    Prior Level of Function:  Prior Function Per Pt/Caregiver Report  Level of [place]: Independent with homemaking with ambulation, Independent with ADLs and functional transfers  Ambulatory Assistance: Independent    Precautions:  Precautions  LE Weight Bearing Status: Weight Bearing as Tolerated (/FWB)  Medical Precautions: Fall precautions  Post-Surgical Precautions: Left hip precautions  Braces Applied: WEDGE X 24 hrs. in bed.    Vital Signs:     Objective     Pain:  Pain Assessment  Pain Assessment: 0-10  Pain Score: 8  Pain Interventions: Cold pack, Medication (See MAR) (just medicaated before my arrival.)    Cognition:  Cognition  Overall Cognitive Status: Within Functional Limits    General Assessments:  General Observation  General Observation: pleasant. good sense of humor.   Activity Tolerance  Endurance: Endurance does not limit participation in activity  Sensation  Light Touch: No apparent deficits  Strength  Strength Comments: grossly WFL b/l quads..     Coordination  Movements are Fluid and Coordinated: Yes  Postural Control  Postural Control: Within Functional Limits          Functional Assessments:     Bed Mobility  Bed Mobility: Yes  Bed Mobility 1  Bed Mobility 1: Supine to sitting, Sitting to supine  Level of Assistance 1: Minimum assistance (x1)  Transfers  Transfer: Yes  Transfer 1  Transfer From 1: Sit to, Stand to  Transfer Device 1: Walker  Transfer Level of Assistance 1: Minimum assistance  Ambulation/Gait Training  Ambulation/Gait Training Performed: Yes  Ambulation/Gait Training 1  Surface 1: Level tile  Device 1: Rolling walker  Gait Support Devices: Gait belt  Assistance 1: Contact guard  Comments/Distance (ft) 1: x100 ft.  used a \"step to\"  pattern.  Stairs  Stairs: No       Extremity/Trunk Assessments:                Outcome Measures:  [facility] Basic Mobility  Turning from your back to your side while " in a flat bed without using bedrails: A little  Moving from lying on your back to sitting on the side of a flat bed without using bedrails: A little  Moving to and from bed to chair (including a wheelchair): A little  Standing up from a chair using your arms (e.g. wheelchair or bedside chair): A little  To walk in hospital room: A little  Climbing 3-5 steps with railing: A little  Basic Mobility - Total Score: 18                            Goals:  Encounter Problems       Encounter Problems (Active)       Mobility       STG - Patient will ambulateX 250 FT..with w/w, modif. indep..        Start:  11/27/23    Expected End:  12/11/23            STG - Patient will ascend and descend four to six stairs with rail and a cane.         Start:  11/27/23    Expected End:  12/11/23               Pain - Adult          Transfers       STG - Patient will perform bed mobility with MODIF. INDEP. with good attention to all THR precautions.         Start:  11/27/23    Expected End:  12/11/23            STG - Patient will transfer sit to and from stand with modif. indep. into a w/w.         Start:  11/27/23    Expected End:  12/11/23                 Education Documentation  Handouts, taught by Los Nichols, PT at 11/27/2023  6:48 PM.  Learner: Patient  Readiness: Eager  Method: Demonstration, Explanation  Response: Demonstrated Understanding, Needs Reinforcement    Precautions, taught by Los Nichols, PT at 11/27/2023  6:48 PM.  Learner: Patient  Readiness: Eager  Method: Demonstration, Explanation  Response: Demonstrated Understanding, Needs Reinforcement    Home Exercise Program, taught by Los Nichols, PT at 11/27/2023  6:48 PM.  Learner: Patient  Readiness: Eager  Method: Demonstration, Explanation  Response: Demonstrated Understanding, Needs Reinforcement    Mobility Training, taught by Los Nichols, PT at 11/27/2023  6:48 PM.  Learner: Patient  Readiness: Eager  Method: Demonstration,  Explanation  Response: Demonstrated Understanding, Needs Reinforcement    Education Comments  No comments found.

## 2023-11-27 NOTE — ANESTHESIA POSTPROCEDURE EVALUATION
Patient: Rosaline Sanches    Procedure Summary       Date: 11/27/23 Room / Location: [facility] OR 02 / Virtual [facility] OR    Anesthesia Start: 1101 Anesthesia Stop: 1245    Procedure: LEFT TOTAL HIP REPLACEMENT (Left: Hip) Diagnosis: (M16.12 - UNILATERAL PRIMARY OSTEOARTHRITIS, LEFT HIP)    Surgeons: Brendan Tatum MD Responsible Provider: Nii Lerma MD    Anesthesia Type: general ASA Status: 2            Anesthesia Type: general    Vitals Value Taken Time   /70 11/27/23 1245   Temp 36 °C (96.8 °F) 11/27/23 1240   Pulse 74 11/27/23 1248   Resp 25 11/27/23 1248   SpO2 96 % 11/27/23 1248   Vitals shown include unvalidated device data.    Anesthesia Post Evaluation    Patient location during evaluation: PACU  Patient participation: complete - patient participated  Level of consciousness: awake  Pain score: 0  Pain management: satisfactory to patient  Airway patency: patent  Cardiovascular status: acceptable  Respiratory status: acceptable  Hydration status: acceptable  Postoperative Nausea and Vomiting: none        There were no known notable events for this encounter.

## 2023-11-27 NOTE — OP NOTE
LEFT TOTAL HIP REPLACEMENT (L) Operative Note     Date: 2023  OR Location: [facility] OR    Name: Rosaline Sanches, : 1955, Age: 68 y.o., MRN: [id number], Sex: female    Diagnosis  * No Diagnosis Codes entered * * No Diagnosis Codes entered *     Procedures  LEFT TOTAL HIP REPLACEMENT  29870 - MS ARTHRP ACETBLR/PROX FEM PROSTC AGRFT/ALGRFT      Surgeons      * Brendan Tatum - Primary    Resident/Fellow/Other Assistant:  Surgeon(s) and Role:    Procedure Summary  Anesthesia: General  ASA: II  Anesthesia Staff: Anesthesiologist: Nii Lerma MD  CRNA: DEB Trimble-CRNA  Estimated Blood Loss: less than 250mL  Intra-op Medications: * No intraprocedure medications in log *           Anesthesia Record               Intraprocedure I/O Totals          Intake    LR 1000.00 mL    Propofol Drip 0.00 mL    The total shown is the total volume documented since Anesthesia Start was filed.    Total Intake 1000 mL       Output    Est. Blood Loss 200 mL    Total Output 200 mL       Net    Net Volume 800 mL          Specimen: No specimens collected     Staff:   Circulator: Santana Delarosa RN  Scrub Person: Silvia Nava; Yung Smith; Prashant Hinojosa         Drains and/or Catheters: * None in log *    Tourniquet Times:         Implants:  Implants       Type Name Action Serial No.      Joint SHELL, TRIDENT II, CLUSTERHOLE, HA 52E - [id number] Implanted      Joint INSERT, TRIDENT X3 POLYETHYLENE, 0 DEG, 36MM E - [id number] Implanted      Joint STEM, FEMUR 132D SZ 4 ACCOLADE II - [id number] Implanted      Joint HEAD, FEMUR V40 36MM -2.5MM BIOLOX DELTA - [id number] Implanted               Findings: see     Indications: Rosaline Sanches is an 68 y.o. female who is having surgery for M16.12 - UNILATERAL PRIMARY OSTEOARTHRITIS, LEFT HIP.     The patient was seen in the preoperative area. The risks, benefits, complications, treatment options, non-operative alternatives, expected recovery and outcomes were discussed with the patient.  The possibilities of reaction to medication, pulmonary aspiration, injury to surrounding structures, bleeding, recurrent infection, the need for additional procedures, failure to diagnose a condition, and creating a complication requiring transfusion or operation were discussed with the patient. The patient concurred with the proposed plan, giving informed consent.  The site of surgery was properly noted/marked if necessary per policy. The patient has been actively warmed in preoperative area. Preoperative antibiotics Ancef given prior to incision    Procedure Details: The patient was brought to the operative suite, identified, induced successful general endotracheal anesthesia .  The patient was then carefully placed in the lateral decubitus position with the left hip being superior.  The axilla as well as peroneal nerve on the dependent side were well padded and protected.  Next, the left lower extremity was prepped and draped in the usual sterile fashion from the buttocks to the toes circumferentially.  After this was done, a time-out was performed.    .  Incision was made, lateral hip dissection was carried down through subcutaneous fat.  Deep fascia was then identified and divided along with the incision.  The fascia was then carefully retracted anteriorly and posteriorly exposing the patient's hip musculature, taking care to identify and protect the sciatic nerve, throughout the entire course of the operation. Short external rotators were resected from the patient's femoral neck.  The hip was dislocated revealing severe degenerative arthritis involving 90% weight bearing surface.    Femoral neck osteotomy was performed using an oscillating saw after this was properly measured. The acetabulum architecture was carefully revealed by removing soft tissue from the periphery. The acetabulum was carefully deepened using circumferential reamers to 52 mm. A 52 mm acetabular shell 10 degree 36 mm inner diameter  was  inserted with excellent fit.   Attention was then placed to the proximal femur, which was brought into view using well-padded hip skid.  Proximal femur was then reamed and broached to accept a #5 Accolade II broach.  A -2.5, 36 mm ball was then used to most closely reproduce the original femoral neck length.  The hip was reduced with the trial components in and were found to be very stable.  For this reason, all trial components were removed. The permanent neutral polyethylene liner was inserted into the acetabular shell.    The permanent #5 Accolate II stem was impacted into place.  This is 132 degree neck angle -2.5, 36 mm by Ceramic BIOLOX  ball was impacted on the trunnion of the taper.  The hip was reduced for final time, found to be stable in all ranges.  Wound was thoroughly irrigated with saline solution.  Posterior capsule and deep fascia and subcu tissue and skin were closed in layers.  Glued mesh was used to seal the incision. A sterile dressing was placed in the patient's hip.  The patient is then roused from anesthesia and transferred to the postanesthesia care unit in stable condition.   The first assistant, physician assistant, was present for the entire operation and was a key portion of the surgical team, being responsible for aiding in positioning exposure vital organ protection and closure.  Complications:  None; patient tolerated the procedure well.    Disposition: PACU - hemodynamically stable.  Condition: stable         Additional Details:     Attending Attestation:     Brendan Tatum  Phone Number: [phone]

## 2023-11-27 NOTE — BRIEF OP NOTE
Date: 2023  OR Location: [facility] OR    Name: Rosaline Sanches, : 1955, Age: 68 y.o., MRN: [id number], Sex: female    Diagnosis  * No Diagnosis Codes entered * * No Diagnosis Codes entered *     Procedures  LEFT TOTAL HIP REPLACEMENT  20756 - MI ARTHRP ACETBLR/PROX FEM PROSTC AGRFT/ALGRFT      Surgeons      * Brendan Tatum - Primary    Resident/Fellow/Other Assistant:  Surgeon(s) and Role:    Procedure Summary  Anesthesia: General  ASA: II  Anesthesia Staff: Anesthesiologist: Nii Lerma MD  CRNA: DEB Trimble-CRNA  Estimated Blood Loss: less than 250mL  Intra-op Medications: * No intraprocedure medications in log *           Anesthesia Record               Intraprocedure I/O Totals          Intake    LR 1000.00 mL    Propofol Drip 0.00 mL    The total shown is the total volume documented since Anesthesia Start was filed.    Total Intake 1000 mL       Output    Est. Blood Loss 200 mL    Total Output 200 mL       Net    Net Volume 800 mL          Specimen: No specimens collected     Staff:   Circulator: Santana Delarosa RN  Scrub Person: Silvia Nava; Yung Smith; Prashant Hinojosa          Findings: see operative note    Complications:  None; patient tolerated the procedure well.     Disposition: PACU - hemodynamically stable.  Condition: stable  Specimens Collected: No specimens collected  Attending Attestation:     Brendan Tatum  Phone Number: [phone]

## 2023-11-27 NOTE — ANESTHESIA PROCEDURE NOTES
Airway  Date/Time: 11/27/2023 11:11 AM  Urgency: elective    Airway not difficult    Staffing  Performed: CRNA   Authorized by: Nii Lerma MD    Performed by: DEB Trimble-JARRED  Patient location during procedure: OR    Indications and Patient Condition  Indications for airway management: anesthesia and airway protection  Spontaneous ventilation: present  Sedation level: deep  Preoxygenated: yes  Patient position: sniffing  Mask difficulty assessment: 1 - vent by mask    Final Airway Details  Final airway type: endotracheal airway      Successful airway: ETT  Cuffed: yes   Successful intubation technique: direct laryngoscopy  Facilitating devices/methods: intubating stylet  Endotracheal tube insertion site: oral  Blade: Sanches  Blade size: #2  ETT size (mm): 6.5  Cormack-Lehane Classification: grade IIa - partial view of glottis  Placement verified by: capnometry   Cuff volume (mL): 7  Measured from: lips  ETT to lips (cm): 22  Number of attempts at approach: 1

## 2023-11-28 VITALS
RESPIRATION RATE: 17 BRPM | OXYGEN SATURATION: 98 % | HEIGHT: 63 IN | WEIGHT: 140 LBS | SYSTOLIC BLOOD PRESSURE: 115 MMHG | DIASTOLIC BLOOD PRESSURE: 65 MMHG | TEMPERATURE: 98.6 F | BODY MASS INDEX: 24.8 KG/M2 | HEART RATE: 68 BPM

## 2023-11-28 LAB
ANION GAP SERPL CALC-SCNC: 9 MMOL/L (ref 10–20)
BUN SERPL-MCNC: 10 MG/DL (ref 6–23)
CALCIUM SERPL-MCNC: 8.8 MG/DL (ref 8.6–10.3)
CHLORIDE SERPL-SCNC: 103 MMOL/L (ref 98–107)
CO2 SERPL-SCNC: 28 MMOL/L (ref 21–32)
CREAT SERPL-MCNC: 0.66 MG/DL (ref 0.5–1.05)
ERYTHROCYTE [DISTWIDTH] IN BLOOD BY AUTOMATED COUNT: 13 % (ref 11.5–14.5)
GFR SERPL CREATININE-BSD FRML MDRD: >90 ML/MIN/1.73M*2
GLUCOSE SERPL-MCNC: 107 MG/DL (ref 74–99)
HCT VFR BLD AUTO: 32.7 % (ref 36–46)
HGB BLD-MCNC: 10.5 G/DL (ref 12–16)
MCH RBC QN AUTO: 31.2 PG (ref 26–34)
MCHC RBC AUTO-ENTMCNC: 32.1 G/DL (ref 32–36)
MCV RBC AUTO: 97 FL (ref 80–100)
NRBC BLD-RTO: 0 /100 WBCS (ref 0–0)
PLATELET # BLD AUTO: 223 X10*3/UL (ref 150–450)
POTASSIUM SERPL-SCNC: 3.9 MMOL/L (ref 3.5–5.3)
RBC # BLD AUTO: 3.37 X10*6/UL (ref 4–5.2)
SODIUM SERPL-SCNC: 136 MMOL/L (ref 136–145)
WBC # BLD AUTO: 12.1 X10*3/UL (ref 4.4–11.3)

## 2023-11-28 PROCEDURE — 80048 BASIC METABOLIC PNL TOTAL CA: CPT | Performed by: PHYSICIAN ASSISTANT

## 2023-11-28 PROCEDURE — 97116 GAIT TRAINING THERAPY: CPT | Mod: GP,CQ

## 2023-11-28 PROCEDURE — 2500000004 HC RX 250 GENERAL PHARMACY W/ HCPCS (ALT 636 FOR OP/ED): Performed by: ORTHOPAEDIC SURGERY

## 2023-11-28 PROCEDURE — 7100000011 HC EXTENDED STAY RECOVERY HOURLY - NURSING UNIT

## 2023-11-28 PROCEDURE — 97110 THERAPEUTIC EXERCISES: CPT | Mod: GP,CQ

## 2023-11-28 PROCEDURE — 36415 COLL VENOUS BLD VENIPUNCTURE: CPT | Performed by: ORTHOPAEDIC SURGERY

## 2023-11-28 PROCEDURE — 97165 OT EVAL LOW COMPLEX 30 MIN: CPT | Mod: GO | Performed by: OCCUPATIONAL THERAPIST

## 2023-11-28 PROCEDURE — 2500000001 HC RX 250 WO HCPCS SELF ADMINISTERED DRUGS (ALT 637 FOR MEDICARE OP): Performed by: ORTHOPAEDIC SURGERY

## 2023-11-28 PROCEDURE — 99232 SBSQ HOSP IP/OBS MODERATE 35: CPT | Performed by: PHYSICIAN ASSISTANT

## 2023-11-28 PROCEDURE — 85027 COMPLETE CBC AUTOMATED: CPT | Performed by: ORTHOPAEDIC SURGERY

## 2023-11-28 PROCEDURE — 36415 COLL VENOUS BLD VENIPUNCTURE: CPT | Performed by: PHYSICIAN ASSISTANT

## 2023-11-28 RX ORDER — CELECOXIB 100 MG/1
100 CAPSULE ORAL EVERY 12 HOURS PRN
Qty: 20 CAPSULE | Refills: 0 | Status: SHIPPED | OUTPATIENT
Start: 2023-11-28 | End: 2023-12-08

## 2023-11-28 RX ORDER — ONDANSETRON 4 MG/1
4 TABLET, ORALLY DISINTEGRATING ORAL EVERY 8 HOURS PRN
Qty: 20 TABLET | Refills: 0 | Status: SHIPPED | OUTPATIENT
Start: 2023-11-28

## 2023-11-28 RX ORDER — ASPIRIN 81 MG/1
81 TABLET ORAL 2 TIMES DAILY
Qty: 60 TABLET | Refills: 0 | Status: SHIPPED | OUTPATIENT
Start: 2023-11-28 | End: 2023-12-28

## 2023-11-28 RX ORDER — DOCUSATE SODIUM 100 MG/1
100 CAPSULE, LIQUID FILLED ORAL 2 TIMES DAILY
Qty: 20 CAPSULE | Refills: 0 | Status: SHIPPED | OUTPATIENT
Start: 2023-11-28

## 2023-11-28 RX ORDER — OXYCODONE HYDROCHLORIDE 5 MG/1
5 TABLET ORAL EVERY 6 HOURS PRN
Qty: 28 TABLET | Refills: 0 | Status: SHIPPED | OUTPATIENT
Start: 2023-11-28 | End: 2023-12-05

## 2023-11-28 RX ADMIN — HYDROMORPHONE HYDROCHLORIDE 0.5 MG: 1 INJECTION, SOLUTION INTRAMUSCULAR; INTRAVENOUS; SUBCUTANEOUS at 00:09

## 2023-11-28 RX ADMIN — OXYCODONE HYDROCHLORIDE 10 MG: 10 TABLET ORAL at 01:09

## 2023-11-28 RX ADMIN — CEFAZOLIN SODIUM 2 G: 2 INJECTION, SOLUTION INTRAVENOUS at 05:07

## 2023-11-28 RX ADMIN — OXYCODONE HYDROCHLORIDE 10 MG: 10 TABLET ORAL at 09:36

## 2023-11-28 RX ADMIN — ASPIRIN 81 MG: 81 TABLET, COATED ORAL at 09:36

## 2023-11-28 RX ADMIN — SODIUM CHLORIDE 100 ML/HR: 9 INJECTION, SOLUTION INTRAVENOUS at 02:07

## 2023-11-28 RX ADMIN — OXYCODONE HYDROCHLORIDE 10 MG: 10 TABLET ORAL at 05:07

## 2023-11-28 RX ADMIN — ACETAMINOPHEN 650 MG: 325 TABLET ORAL at 05:07

## 2023-11-28 ASSESSMENT — COGNITIVE AND FUNCTIONAL STATUS - GENERAL
MOVING TO AND FROM BED TO CHAIR: A LITTLE
DRESSING REGULAR LOWER BODY CLOTHING: A LOT
HELP NEEDED FOR BATHING: A LITTLE
WALKING IN HOSPITAL ROOM: A LITTLE
TOILETING: A LITTLE
PERSONAL GROOMING: A LITTLE
DAILY ACTIVITIY SCORE: 18
MOBILITY SCORE: 22
DAILY ACTIVITIY SCORE: 23
CLIMB 3 TO 5 STEPS WITH RAILING: A LITTLE
TOILETING: A LITTLE
MOBILITY SCORE: 20
DRESSING REGULAR UPPER BODY CLOTHING: A LITTLE
WALKING IN HOSPITAL ROOM: A LITTLE
CLIMB 3 TO 5 STEPS WITH RAILING: A LITTLE
TURNING FROM BACK TO SIDE WHILE IN FLAT BAD: A LITTLE

## 2023-11-28 ASSESSMENT — PAIN SCALES - GENERAL
PAINLEVEL_OUTOF10: 6
PAINLEVEL_OUTOF10: 8
PAINLEVEL_OUTOF10: 4
PAINLEVEL_OUTOF10: 9
PAINLEVEL_OUTOF10: 8
PAINLEVEL_OUTOF10: 7
PAINLEVEL_OUTOF10: 8

## 2023-11-28 ASSESSMENT — PAIN DESCRIPTION - DESCRIPTORS
DESCRIPTORS: ACHING

## 2023-11-28 ASSESSMENT — PAIN - FUNCTIONAL ASSESSMENT
PAIN_FUNCTIONAL_ASSESSMENT: 0-10

## 2023-11-28 NOTE — DISCHARGE SUMMARY
Discharge summary      This patient Rosaline Sanches was admitted to the hospital on 11/27/2023  after undergoing Procedure(s) (LRB):  LEFT TOTAL HIP REPLACEMENT (Left) without complications that morning.   No significant or unexpected findings or abnormal ortho imaging were noted during the hospital stay    Hospital course      Patient tolerated surgical procedure well and there was no complications. Patient progressed adequately through their recovery during hospital stay including PT and rehabilitation.    Patient was then D/C on  to home  in stable condition.  Patient was instructed on the use of pain medications, the signs and symptoms of infection, and was given our number to call should they have any questions or concerns following discharge.    Based on my clinical judgment, the patient was provided with a 7-day prescription for opioid medication at 30 MED, indicated for treatment of acute pain in the setting of recent left DEACON. The patient has been provided with counseling pertaining to safe use of opioid medication.    Pertinent Physical Exam At Time of Discharge  Alert, oriented x 3, cooperative with examination.     Examination of the left lower  extremity reveals left hip   dressing is intact without drainage.  No merrick-incisional ecchymosis.  EHL, plantarflexion, dorsiflexion are 4+/5.  Able to isometrically fire left quadriceps.  Sensory intact light touch in the deep/superficial/common peroneal, saphenous, tibial, sural nerve distributions.  DP and popliteal pulses are 2+ with capillary refill less than 2 seconds.  Negative Homans' sign.      Home Medications     Medication List      START taking these medications     aspirin 81 mg EC tablet; Take 1 tablet (81 mg) by mouth 2 times a day.   celecoxib 100 mg capsule; Commonly known as: CeleBREX; Take 1 capsule   (100 mg) by mouth every 12 hours if needed for moderate pain (4 - 6) (&   severe pain 7-10) for up to 10 days.   docusate sodium 100 mg  capsule; Commonly known as: Colace; Take 1   capsule (100 mg) by mouth 2 times a day.   ondansetron ODT 4 mg disintegrating tablet; Commonly known as:   Zofran-ODT; Take 1 tablet (4 mg) by mouth every 8 hours if needed for   nausea or vomiting.   oxyCODONE 5 mg immediate release tablet; Commonly known as: Roxicodone;   Take 1 tablet (5 mg) by mouth every 6 hours if needed for moderate pain (4   - 6) or severe pain (7 - 10) for up to 7 days.     CONTINUE taking these medications     MAGNESIUM GLYCINATE ORAL   multivitamin-Ca-iron-minerals 27-0.4 mg tablet; Commonly known as:   Tab-A-Louie Womens     STOP taking these medications     chlorhexidine 0.12 % solution; Commonly known as: Peridex   naproxen 250 mg tablet; Commonly known as: Naprosyn           Patient may bear weight as tolerated to operative extremity with use of walker for assistance with ambulation   Surgical dressing to be removed pod7 and incision left open to air  Aspirin for DVT prophylaxis started on 11/28 and to be taken for 30 days  Follow up with surgeon in 2 weeks    Radiology images XR pelvis 1-2 views    Result Date: 11/27/2023  Interpreted By:  Phoenix Lr, STUDY: XR PELVIS 1-2 VIEWS 11/27/2023 1:14 pm   INDICATION: Signs/Symptoms:Post op hip   COMPARISON: 05/23/2018   ACCESSION NUMBER(S): [id number]   ORDERING CLINICIAN: BK STRONG   TECHNIQUE: A single AP view of the pelvis was obtained.   FINDINGS: The patient is noted to status post left total hip arthroplasty. There is no evidence of acute fracture or dislocation identified.       1.  Postoperative changes status post left hip arthroplasty. 2. No evidence of hardware failure or acute fracture..   MACRO: None.     Signed by: Phoenix Lr 11/27/2023 1:56 PM Dictation workstation:   [id number]     She will follow up with Dr. Strong in 2-3 weeks    Outpatient Follow-Up  No future appointments.      Beverly Singh PA-C

## 2023-11-28 NOTE — PROGRESS NOTES
Physical Therapy    Physical Therapy    Physical Therapy Treatment    Patient Name: Rosaline Sanches  MRN: [id number]  Today's Date: 11/28/2023  Time Calculation  Start Time: 0722  Stop Time: 0802  Time Calculation (min): 40 min       Assessment/Plan   PT Assessment  PT Assessment Results: Decreased strength, Impaired balance, Decreased range of motion  Rehab Prognosis: Good  Evaluation/Treatment Tolerance: Patient tolerated treatment well  Medical Staff Made Aware: Yes  End of Session Communication: Bedside nurse  Assessment Comment: expect good progress toward goals.  End of Session Patient Position: Up in chair  PT Plan  Inpatient/Swing Bed or Outpatient: Inpatient  PT Plan  Treatment/Interventions: Bed mobility, Transfer training, Gait training, Stair training, Strengthening  PT Plan:  (expect home with HHC/PT.)  PT Frequency: BID  PT Discharge Recommendations: Low intensity level of continued care  Equipment Recommended upon Discharge: Wheeled walker, Straight cane  PT Recommended Transfer Status: Assist x1  PT - OK to Discharge: Yes     11/28/23 0722   PT  Visit   PT Received On 11/28/23   General   Reason for Referral L THR therex gait and transfer training   Referred By Brendan Tatum MD   Precautions   Post-Surgical Precautions Left hip precautions   Pain Assessment   Pain Assessment 0-10   Pain Score 8   Pain Location Hip   Pain Orientation Left   Therapeutic Exercise   Therapeutic Exercise Performed Yes   Therapeutic Exercise Activity 1 AP,  QS,  GS,  Heelslides,  LAQ,  Ball squeezes,  Hip Abduction x 20  each B  with emphasis on  L LE   Modalities   Modalities Used Yes   Bed Mobility   Bed Mobility Yes  (ice)   Bed Mobility 1   Bed Mobility 1 Supine to sitting   Level of Assistance 1 Modified independent   Ambulation/Gait Training   Ambulation/Gait Training Performed Yes   Ambulation/Gait Training 1   Surface 1 Level tile   Device 1 Rolling walker   Gait Support Devices Gait belt   Assistance 1 Close  supervision   Comments/Distance (ft) 1 220,100  (Patient demos slower mildly reciprocating steps)   Transfers   Transfer Yes   Transfer 1   Transfer From 1 Sit to   Transfer to 1 Stand   Transfer Device 1 Walker   Transfer Level of Assistance 1 Close supervision   Trials/Comments 1 x2  (Needs reminders to maintain precautions)   Stairs   Stairs Yes   Stairs   Rails 1 Left   Device 1 Single point cane   Support Devices 1 Gait belt   Assistance 1 Close supervision;Contact guard   Comment/Number of Steps 1 3   PT Assessment   PT Assessment Results Decreased strength;Impaired balance;Decreased range of motion   End of Session Patient Position Up in chair   PT Plan   Inpatient/Swing Bed or Outpatient Inpatient     Outcome Measures:  [facility] Basic Mobility  Turning from your back to your side while in a flat bed without using bedrails: None  Moving from lying on your back to sitting on the side of a flat bed without using bedrails: A little  Moving to and from bed to chair (including a wheelchair): A little  Standing up from a chair using your arms (e.g. wheelchair or bedside chair): None  To walk in hospital room: A little  Climbing 3-5 steps with railing: A little  Basic Mobility - Total Score: 20  Education Documentation  No documentation found.  Education Comments  No comments found.            EDUCATION:       GOALS:  Encounter Problems       Encounter Problems (Active)       Mobility       STG - Patient will ambulateX 250 FT..with w/w, modif. indep..  (Progressing)       Start:  11/27/23    Expected End:  12/11/23            STG - Patient will ascend and descend four to six stairs with rail and a cane.   (Progressing)       Start:  11/27/23    Expected End:  12/11/23               Pain - Adult          Transfers       STG - Patient will perform bed mobility with MODIF. INDEP. with good attention to all THR precautions.   (Progressing)       Start:  11/27/23    Expected End:  12/11/23            STG - Patient will  transfer sit to and from stand with modif. indep. into a w/w.   (Progressing)       Start:  11/27/23    Expected End:  12/11/23

## 2023-11-28 NOTE — PROGRESS NOTES
11/28/23 1051   Discharge Planning   Living Arrangements Alone   Support Systems Children;Family members   Type of Residence Private residence   Home or Post Acute Services None   Patient expects to be discharged to: HOme   Does the patient need discharge transport arranged? No     Met with patient at bedside. Admitted for left total hip replacement. Pt lives with son  and was independent PTA with no HHC. Pt has a walker. Able to obtain medications. PT AMPAC 20. Pt plans to return home with no new discharge needs. Her sister is a physical therapist and will be staying with her and doing her therapy. Verified this was OK with Dr Tatum. Family will provide transport home.

## 2023-11-28 NOTE — PROGRESS NOTES
"Rosaline Sanches is a 68 y.o. female on day 2 of admission presenting with Osteoarthritis of left hip, unspecified osteoarthritis type.    Subjective   Ms. Sanches feels good today, better than she did following her right hip surgery.  She is tolerating oral intake and voiding without any issues.  Her left hip pain is moderate.  She is looking forward today.       Objective     Physical Exam  Vitals reviewed.   HENT:      Head: Normocephalic.      Mouth/Throat:      Mouth: Mucous membranes are moist.      Pharynx: Oropharynx is clear.   Eyes:      Extraocular Movements: Extraocular movements intact.   Cardiovascular:      Rate and Rhythm: Normal rate.   Pulmonary:      Effort: Pulmonary effort is normal.   Abdominal:      Palpations: Abdomen is soft.   Musculoskeletal:      Comments: Left hip dressing is dry and intact.  light touch sensation is intact, ankle dorsiflexion/plantar flexion is intact. DP 2+/2 palpable     Skin:     General: Skin is warm and dry.      Capillary Refill: Capillary refill takes less than 2 seconds.   Neurological:      General: No focal deficit present.      Mental Status: She is alert. Mental status is at baseline.   Psychiatric:         Mood and Affect: Mood normal.         Last Recorded Vitals  Blood pressure 150/77, pulse 76, temperature 36.5 °C (97.7 °F), resp. rate 18, height 1.6 m (5' 3\"), weight 63.5 kg (140 lb), SpO2 96 %.  Intake/Output last 3 Shifts:  I/O last 3 completed shifts:  In: 2870 (45.2 mL/kg) [I.V.:1070 (16.8 mL/kg); IV Piggyback:1800]  Out: 200 (3.1 mL/kg) [Blood:200]  Dosing Weight: 63.5 kg     Relevant Results      Scheduled medications  acetaminophen, 650 mg, oral, q6h GORDON  aspirin, 81 mg, oral, BID  polyethylene glycol, 17 g, oral, Daily      Continuous medications  oxygen, 2 L/min  sodium chloride 0.9%, 100 mL/hr, Last Rate: 100 mL/hr (11/28/23 0259)      PRN medications  PRN medications: diphenhydrAMINE, HYDROmorphone, naloxone, ondansetron ODT **OR** " ondansetron, oxyCODONE, oxyCODONE  Results for orders placed or performed during the hospital encounter of 11/27/23 (from the past 24 hour(s))   CBC   Result Value Ref Range    WBC 12.1 (H) 4.4 - 11.3 x10*3/uL    nRBC 0.0 0.0 - 0.0 /100 WBCs    RBC 3.37 (L) 4.00 - 5.20 x10*6/uL    Hemoglobin 10.5 (L) 12.0 - 16.0 g/dL    Hematocrit 32.7 (L) 36.0 - 46.0 %    MCV 97 80 - 100 fL    MCH 31.2 26.0 - 34.0 pg    MCHC 32.1 32.0 - 36.0 g/dL    RDW 13.0 11.5 - 14.5 %    Platelets 223 150 - 450 x10*3/uL   Basic Metabolic Panel   Result Value Ref Range    Glucose 107 (H) 74 - 99 mg/dL    Sodium 136 136 - 145 mmol/L    Potassium 3.9 3.5 - 5.3 mmol/L    Chloride 103 98 - 107 mmol/L    Bicarbonate 28 21 - 32 mmol/L    Anion Gap 9 (L) 10 - 20 mmol/L    Urea Nitrogen 10 6 - 23 mg/dL    Creatinine 0.66 0.50 - 1.05 mg/dL    eGFR >90 >60 mL/min/1.73m*2    Calcium 8.8 8.6 - 10.3 mg/dL                            Assessment/Plan   Principal Problem:    Osteoarthritis of left hip, unspecified osteoarthritis type    POD #1 s/p left DEACON  Continue PT/OT, WBAT left  LE  Using incentive spirometer   Reviewed am labs  Multimodal pain regimen  Surgical hip dressing to be removed on post op day #7  VTE prophylaxis: asa 81mg BID X 30days  When appropriate, will discharge home with [facility]  Follow up with Dr. Tatum in 2-3 weeks         I spent 25 minutes in the professional and overall care of this patient.      Beverly Singh PA-C

## 2023-11-28 NOTE — PROGRESS NOTES
Occupational Therapy    Evaluation    Patient Name: Rosaline Sanches  MRN: [id number]  Today's Date: 11/28/2023  Time Calculation  Start Time: 0903  Stop Time: 0933  Time Calculation (min): 30 min  Eval only   Assessment  IP OT Assessment  Prognosis: Good  Evaluation/Treatment Tolerance: Patient tolerated treatment well  Medical Staff Made Aware: Yes  End of Session Communication: Bedside nurse  End of Session Patient Position: Up in chair  Patient presents with decline in ADLs, functional transfers, functional mobility and would benefit from OT during acute stay to improve functional independence and safety. Recommend low intensity OT to maximize functional independence and safety.     Plan:  Treatment Interventions: ADL retraining, Functional transfer training, Patient/family training, Equipment evaluation/education, Compensatory technique education  OT Frequency: 1 time per day until discharge  OT Discharge Recommendations: Low intensity level of continued care  Equipment Recommended upon Discharge: Wheeled walker (shower chair, reacher, sock aide, LH shoe horn)  OT Recommended Transfer Status: Assist of 1  OT - OK to Discharge: Yes from acute care OT services to the next level of care when cleared by medical team      Subjective     Current Problem:  1. Osteoarthritis of left hip, unspecified osteoarthritis type  aspirin 81 mg EC tablet    ondansetron ODT (Zofran-ODT) 4 mg disintegrating tablet    oxyCODONE (Roxicodone) 5 mg immediate release tablet    docusate sodium (Colace) 100 mg capsule    celecoxib (CeleBREX) 100 mg capsule          General:  General  Reason for Referral: left DEACON  Referred By: Brendan Tatum MD  Past Medical History Relevant to Rehab: Admitted 11/27/2023 after having left DEACON completed by Dr Tatum.  PMH: arthritis  Prior to Session Communication: Bedside nurse  Patient Position Received: Up in chair, Alarm on  Preferred Learning Style: verbal  General Comment: RN cleared patient to work with  therapy. Patient seated in bedside chair and agreeable to participate in OT evaluation.    Precautions:  LE Weight Bearing Status: Weight Bearing as Tolerated  Medical Precautions: Fall precautions      Pain:  Pain Assessment  Pain Assessment: 0-10  Pain Score: 9  Pain Type: Surgical pain  Pain Location: Hip  Pain Orientation: Left  Pain Interventions: Medication (See MAR)    Objective     Cognition:  Overall Cognitive Status: Within Functional Limits    Home Living:  Home Living Comments: Lives at home with adult children and sister who is a therapist.  Has 1 NIKITA.  Has tub shower with GB. Has 12 steps down to basement     Prior Function:  Prior Function Comments: Was independent with all ADLs and IADLs    IADL History:       ADL:  UE Dressing Assistance: Stand by  LE Dressing Assistance: Moderate  ADL Comments: Educated patient on safety and compensatory strateiges for lower body dressing.  Patient verbalized understanding but declines to use AE.  Instructed patient on use of reacher and sock aide. Patient utilized sock aide and reacher during session.  Encouraged patient to have family obtain AE for lower body dressing.    Activity Tolerance:  Endurance: Endurance does not limit participation in activity    Bed Mobility/Transfers:      Transfers  Transfer:  (CGA sit <> stand with min verbal cues for safety and technqiue.)  Educated patient on safety with car transfers and patient verbalized understanding.      Extremities:   and LUE   LUE: Within Functional Limits    Outcome Measures: [facility] Daily Activity  Putting on and taking off regular lower body clothing: A lot  Bathing (including washing, rinsing, drying): A little  Putting on and taking off regular upper body clothing: A little  Toileting, which includes using toilet, bedpan or urinal: A little  Taking care of personal grooming such as brushing teeth: A little  Eating Meals: None  Daily Activity - Total Score: 18    EDUCATION:  Education  Individual(s)  Educated: Patient  Education Provided: Fall precautons  Patient Response to Education: Patient/Caregiver Verbalized Understanding of Information        Goals:   Encounter Problems       Encounter Problems (Active)       Balance       STG-Patient will be independent with assistive device dynamic stand task >5 minutes for ADL completion   (Progressing)       Start:  11/28/23    Expected End:  12/12/23               Dressings Lower Extremities       STG - Patient will complete lower body dressing independently with AE and comp strategies  (Progressing)       Start:  11/28/23    Expected End:  12/12/23                 Mobility       STG-Patient will be independent with assistive device functional mobility tasks   (Progressing)       Start:  11/28/23    Expected End:  12/12/23                 Transfers       STG - Patient will perform car transfers independently demonstrating good safety  (Progressing)       Start:  11/28/23    Expected End:  12/12/23            STG-Patient will be independent with functional transfers demonstrating good safety   (Progressing)       Start:  11/28/23    Expected End:  12/12/23

## 2023-12-14 LAB
ATRIAL RATE: 77 BPM
P AXIS: 74 DEGREES
P OFFSET: 199 MS
P ONSET: 140 MS
PR INTERVAL: 164 MS
Q ONSET: 222 MS
QRS COUNT: 13 BEATS
QRS DURATION: 86 MS
QT INTERVAL: 390 MS
QTC CALCULATION(BAZETT): 441 MS
QTC FREDERICIA: 423 MS
R AXIS: 7 DEGREES
T AXIS: 67 DEGREES
T OFFSET: 417 MS
VENTRICULAR RATE: 77 BPM

## 2023-12-20 DIAGNOSIS — M16.12 OSTEOARTHRITIS OF LEFT HIP, UNSPECIFIED OSTEOARTHRITIS TYPE: ICD-10-CM

## 2023-12-22 RX ORDER — ASPIRIN 81 MG/1
81 TABLET ORAL 2 TIMES DAILY
Qty: 180 TABLET | Refills: 1 | OUTPATIENT
Start: 2023-12-22 | End: 2024-01-21

## (undated) DEVICE — SUTURE, VICRYL, 1, 27 IN, CT-1 CR, UNDYED

## (undated) DEVICE — ELECTRODE, ELECTROSURGICAL, BLADE, EXTENDED

## (undated) DEVICE — DRESSING, MEPILEX BORDER, POST-OP AG, 4 X 10 IN

## (undated) DEVICE — SKIN CLOSURE SYS, PREMIERPRO EXOFIN, 1-4CM X 22CM, 1.75G TUBE

## (undated) DEVICE — DRAPE, SHEET, THREE QUARTER, FAN FOLD, 57 X 77 IN

## (undated) DEVICE — GOWN, ISOLATION, IMPERVIOUS, XLARGE, LF, BLUE

## (undated) DEVICE — SUTURE, STARTAFIX, SYMMETRIC, PDS PLUS, 60CM CT-1

## (undated) DEVICE — GLOVE, SURGICAL, PROTEXIS PI W/NEU-THERA, 8.0, PF, LF

## (undated) DEVICE — CATHETER TRAY, URETHRAL, FOLEY, 16 FR, SILICONE

## (undated) DEVICE — PILLOW, ABDUCTION, MEDIUM

## (undated) DEVICE — SUTURE, VICRYL, 2-0, 36 IN, CT-1, UNDYED

## (undated) DEVICE — APPLICATOR, CHLORAPREP, W/ORANGE TINT, 26ML

## (undated) DEVICE — SOLUTION, IRRIGATION, STERILE WATER, 1000 ML, POUR BOTTLE

## (undated) DEVICE — SOLUTION, IRRIGATION, SODIUM CHLORIDE 0.9%, 1000 ML, POUR BOTTLE

## (undated) DEVICE — COVER HANDLE LIGHT, STERIS, BLUE, STERILE

## (undated) DEVICE — BLADE, SAGITTAL, THIN, SHORT, LF

## (undated) DEVICE — SUTURE, MONOCRYL, 3-0, 18 IN, PS2, UNDYED

## (undated) DEVICE — SUTURE, STRATAFIX, SPIRAL MONOCRYL PLUS, 3-0, PS-2 45CM, UNDYED

## (undated) DEVICE — DRAPE, TAPE, ORTHO

## (undated) DEVICE — GLOVE, SURGICAL, PROTEXIS PI , 7.5, PF, LF

## (undated) DEVICE — PHOTONSABER, W/YANKAUER, TAPER TIP

## (undated) DEVICE — TOWEL PACK, STERILE, 4/PACK, BLUE

## (undated) DEVICE — HOOD, STERISHIELD T4 SYSTEM

## (undated) DEVICE — Device

## (undated) DEVICE — DRAPE, INSTRUMENT, W/POUCH, STERI DRAPE, 7 X 11 IN, DISPOSABLE, STERILE